# Patient Record
Sex: MALE | Race: BLACK OR AFRICAN AMERICAN | Employment: OTHER | URBAN - METROPOLITAN AREA
[De-identification: names, ages, dates, MRNs, and addresses within clinical notes are randomized per-mention and may not be internally consistent; named-entity substitution may affect disease eponyms.]

---

## 2023-02-01 PROBLEM — Z96.1: Noted: 2023-02-01

## 2023-04-25 ENCOUNTER — FOLLOW UP (OUTPATIENT)
Dept: URBAN - METROPOLITAN AREA CLINIC 26 | Facility: CLINIC | Age: 78
End: 2023-04-25

## 2023-04-25 DIAGNOSIS — E11.3293: ICD-10-CM

## 2023-04-25 DIAGNOSIS — H35.62: ICD-10-CM

## 2023-04-25 DIAGNOSIS — H30.23: ICD-10-CM

## 2023-04-25 DIAGNOSIS — H20.9: ICD-10-CM

## 2023-04-25 DIAGNOSIS — H35.373: ICD-10-CM

## 2023-04-25 DIAGNOSIS — H43.813: ICD-10-CM

## 2023-04-25 DIAGNOSIS — H04.123: ICD-10-CM

## 2023-04-25 PROCEDURE — 92014 COMPRE OPH EXAM EST PT 1/>: CPT

## 2023-04-25 PROCEDURE — 92250 FUNDUS PHOTOGRAPHY W/I&R: CPT

## 2023-04-25 RX ORDER — PREDNISOLONE ACETATE 10 MG/ML: 1 SUSPENSION/ DROPS OPHTHALMIC

## 2023-04-25 ASSESSMENT — VISUAL ACUITY
OS_SC: 20/20
OD_SC: 20/20-2

## 2023-04-25 ASSESSMENT — TONOMETRY
OS_IOP_MMHG: 13
OD_IOP_MMHG: 15

## 2023-05-15 ENCOUNTER — FOLLOW UP (OUTPATIENT)
Dept: URBAN - METROPOLITAN AREA CLINIC 26 | Facility: CLINIC | Age: 78
End: 2023-05-15

## 2023-05-15 DIAGNOSIS — H30.23: ICD-10-CM

## 2023-05-15 DIAGNOSIS — H43.813: ICD-10-CM

## 2023-05-15 DIAGNOSIS — H35.373: ICD-10-CM

## 2023-05-15 DIAGNOSIS — H04.123: ICD-10-CM

## 2023-05-15 DIAGNOSIS — H20.9: ICD-10-CM

## 2023-05-15 DIAGNOSIS — E11.3293: ICD-10-CM

## 2023-05-15 DIAGNOSIS — H35.63: ICD-10-CM

## 2023-05-15 PROCEDURE — 92134 CPTRZ OPH DX IMG PST SGM RTA: CPT

## 2023-05-15 PROCEDURE — 92250 FUNDUS PHOTOGRAPHY W/I&R: CPT

## 2023-05-15 PROCEDURE — 92012 INTRM OPH EXAM EST PATIENT: CPT

## 2023-05-15 ASSESSMENT — TONOMETRY
OD_IOP_MMHG: 18
OS_IOP_MMHG: 16

## 2023-05-15 ASSESSMENT — VISUAL ACUITY
OD_SC: 20/20-1
OS_SC: 20/30+2

## 2023-06-01 ENCOUNTER — FOLLOW UP (OUTPATIENT)
Dept: URBAN - METROPOLITAN AREA CLINIC 26 | Facility: CLINIC | Age: 78
End: 2023-06-01

## 2023-06-01 DIAGNOSIS — E11.3293: ICD-10-CM

## 2023-06-01 DIAGNOSIS — H35.373: ICD-10-CM

## 2023-06-01 DIAGNOSIS — H30.23: ICD-10-CM

## 2023-06-01 DIAGNOSIS — H35.63: ICD-10-CM

## 2023-06-01 DIAGNOSIS — H04.123: ICD-10-CM

## 2023-06-01 DIAGNOSIS — H20.9: ICD-10-CM

## 2023-06-01 DIAGNOSIS — H43.813: ICD-10-CM

## 2023-06-01 PROCEDURE — 92012 INTRM OPH EXAM EST PATIENT: CPT

## 2023-06-01 RX ORDER — DOXYCYCLINE HYCLATE 100MG 100 MG/1: 1 CAPSULE ORAL TWICE A DAY

## 2023-06-01 ASSESSMENT — VISUAL ACUITY
OD_SC: 20/25-1
OS_SC: 20/20-2

## 2023-06-01 ASSESSMENT — TONOMETRY
OS_IOP_MMHG: 12
OD_IOP_MMHG: 15

## 2023-06-12 ENCOUNTER — EMERGENCY VISIT (OUTPATIENT)
Dept: URBAN - METROPOLITAN AREA CLINIC 26 | Facility: CLINIC | Age: 78
End: 2023-06-12

## 2023-06-12 DIAGNOSIS — H04.123: ICD-10-CM

## 2023-06-12 DIAGNOSIS — H20.9: ICD-10-CM

## 2023-06-12 DIAGNOSIS — E11.3293: ICD-10-CM

## 2023-06-12 DIAGNOSIS — H30.23: ICD-10-CM

## 2023-06-12 DIAGNOSIS — H35.373: ICD-10-CM

## 2023-06-12 DIAGNOSIS — H35.63: ICD-10-CM

## 2023-06-12 DIAGNOSIS — H43.813: ICD-10-CM

## 2023-06-12 PROCEDURE — 92012 INTRM OPH EXAM EST PATIENT: CPT

## 2023-06-12 RX ORDER — PREDNISOLONE ACETATE 10 MG/ML: 1 SUSPENSION/ DROPS OPHTHALMIC

## 2023-06-12 ASSESSMENT — VISUAL ACUITY: OD_SC: 20/30+2

## 2023-06-12 ASSESSMENT — TONOMETRY: OD_IOP_MMHG: 12

## 2023-07-17 ENCOUNTER — FOLLOW UP (OUTPATIENT)
Dept: URBAN - METROPOLITAN AREA CLINIC 26 | Facility: CLINIC | Age: 78
End: 2023-07-17

## 2023-07-17 DIAGNOSIS — H43.813: ICD-10-CM

## 2023-07-17 DIAGNOSIS — H04.123: ICD-10-CM

## 2023-07-17 DIAGNOSIS — H35.63: ICD-10-CM

## 2023-07-17 DIAGNOSIS — H35.373: ICD-10-CM

## 2023-07-17 DIAGNOSIS — E11.3293: ICD-10-CM

## 2023-07-17 DIAGNOSIS — H30.23: ICD-10-CM

## 2023-07-17 DIAGNOSIS — D48.5: ICD-10-CM

## 2023-07-17 PROCEDURE — 92014 COMPRE OPH EXAM EST PT 1/>: CPT

## 2023-07-17 PROCEDURE — 92134 CPTRZ OPH DX IMG PST SGM RTA: CPT

## 2023-07-17 PROCEDURE — 92250 FUNDUS PHOTOGRAPHY W/I&R: CPT

## 2023-07-17 ASSESSMENT — TONOMETRY
OS_IOP_MMHG: 14
OD_IOP_MMHG: 17

## 2023-07-17 ASSESSMENT — VISUAL ACUITY
OD_SC: 20/20-2
OS_SC: 20/20

## 2023-08-21 ENCOUNTER — FOLLOW UP (OUTPATIENT)
Dept: URBAN - METROPOLITAN AREA CLINIC 26 | Facility: CLINIC | Age: 78
End: 2023-08-21

## 2023-08-21 DIAGNOSIS — H35.373: ICD-10-CM

## 2023-08-21 DIAGNOSIS — H30.23: ICD-10-CM

## 2023-08-21 DIAGNOSIS — H04.123: ICD-10-CM

## 2023-08-21 DIAGNOSIS — E11.3293: ICD-10-CM

## 2023-08-21 DIAGNOSIS — H20.9: ICD-10-CM

## 2023-08-21 DIAGNOSIS — D48.5: ICD-10-CM

## 2023-08-21 DIAGNOSIS — H35.63: ICD-10-CM

## 2023-08-21 DIAGNOSIS — H43.813: ICD-10-CM

## 2023-08-21 PROCEDURE — 92134 CPTRZ OPH DX IMG PST SGM RTA: CPT

## 2023-08-21 PROCEDURE — 99213 OFFICE O/P EST LOW 20 MIN: CPT

## 2023-08-21 PROCEDURE — 92250 FUNDUS PHOTOGRAPHY W/I&R: CPT

## 2023-08-21 ASSESSMENT — VISUAL ACUITY
OD_SC: 20/20-2
OS_SC: 20/25+2

## 2023-08-21 ASSESSMENT — TONOMETRY
OD_IOP_MMHG: 19
OS_IOP_MMHG: 13

## 2023-10-24 ENCOUNTER — FOLLOW UP (OUTPATIENT)
Dept: URBAN - METROPOLITAN AREA CLINIC 26 | Facility: CLINIC | Age: 78
End: 2023-10-24

## 2023-10-24 DIAGNOSIS — H04.123: ICD-10-CM

## 2023-10-24 DIAGNOSIS — E11.3293: ICD-10-CM

## 2023-10-24 DIAGNOSIS — H30.23: ICD-10-CM

## 2023-10-24 DIAGNOSIS — H20.9: ICD-10-CM

## 2023-10-24 DIAGNOSIS — H43.813: ICD-10-CM

## 2023-10-24 DIAGNOSIS — H35.63: ICD-10-CM

## 2023-10-24 DIAGNOSIS — Z96.1: ICD-10-CM

## 2023-10-24 DIAGNOSIS — H35.373: ICD-10-CM

## 2023-10-24 PROCEDURE — 92235 FLUORESCEIN ANGRPH MLTIFRAME: CPT

## 2023-10-24 PROCEDURE — 99213 OFFICE O/P EST LOW 20 MIN: CPT

## 2023-10-24 PROCEDURE — 92134 CPTRZ OPH DX IMG PST SGM RTA: CPT

## 2023-10-24 PROCEDURE — 92250 FUNDUS PHOTOGRAPHY W/I&R: CPT

## 2023-10-24 ASSESSMENT — VISUAL ACUITY
OD_SC: 20/15
OS_SC: 20/25-2

## 2023-10-24 ASSESSMENT — TONOMETRY
OD_IOP_MMHG: 14
OS_IOP_MMHG: 15

## 2025-01-28 ENCOUNTER — FOLLOW UP (OUTPATIENT)
Age: 80
End: 2025-01-28

## 2025-01-28 VITALS — BODY MASS INDEX: 27.35 KG/M2 | WEIGHT: 191 LBS | HEIGHT: 70 IN

## 2025-01-28 DIAGNOSIS — H30.23: ICD-10-CM

## 2025-01-28 DIAGNOSIS — H04.123: ICD-10-CM

## 2025-01-28 DIAGNOSIS — H43.813: ICD-10-CM

## 2025-01-28 DIAGNOSIS — E11.3293: ICD-10-CM

## 2025-01-28 DIAGNOSIS — H20.9: ICD-10-CM

## 2025-01-28 DIAGNOSIS — H35.63: ICD-10-CM

## 2025-01-28 DIAGNOSIS — D48.5: ICD-10-CM

## 2025-01-28 DIAGNOSIS — H35.373: ICD-10-CM

## 2025-01-28 PROCEDURE — 92014 COMPRE OPH EXAM EST PT 1/>: CPT

## 2025-01-28 PROCEDURE — 92134 CPTRZ OPH DX IMG PST SGM RTA: CPT

## 2025-01-28 PROCEDURE — 92250 FUNDUS PHOTOGRAPHY W/I&R: CPT | Mod: 59

## 2025-03-26 ENCOUNTER — P2P PATIENT RECORD (OUTPATIENT)
Age: 80
End: 2025-03-26

## 2025-03-27 ENCOUNTER — TELEPHONE ENCOUNTER (OUTPATIENT)
Dept: URBAN - METROPOLITAN AREA CLINIC 64 | Facility: CLINIC | Age: 80
End: 2025-03-27

## 2025-04-03 ENCOUNTER — LAB OUTSIDE AN ENCOUNTER (OUTPATIENT)
Dept: URBAN - METROPOLITAN AREA CLINIC 63 | Facility: CLINIC | Age: 80
End: 2025-04-03

## 2025-04-03 ENCOUNTER — DASHBOARD ENCOUNTERS (OUTPATIENT)
Age: 80
End: 2025-04-03

## 2025-04-03 ENCOUNTER — OFFICE VISIT (OUTPATIENT)
Dept: URBAN - METROPOLITAN AREA CLINIC 63 | Facility: CLINIC | Age: 80
End: 2025-04-03
Payer: COMMERCIAL

## 2025-04-03 DIAGNOSIS — R13.19 ESOPHAGEAL DYSPHAGIA: ICD-10-CM

## 2025-04-03 PROCEDURE — 99204 OFFICE O/P NEW MOD 45 MIN: CPT

## 2025-04-03 RX ORDER — LOSARTAN POTASSIUM 50 MG/1
TABLET, FILM COATED ORAL
Qty: 100 TABLET | Status: ACTIVE | COMMUNITY

## 2025-04-03 RX ORDER — ROSUVASTATIN CALCIUM 10 MG/1
TABLET, FILM COATED ORAL
Qty: 100 TABLET | Status: ACTIVE | COMMUNITY

## 2025-04-03 RX ORDER — SEMAGLUTIDE 0.68 MG/ML
INJECTION, SOLUTION SUBCUTANEOUS
Qty: 9 MILLILITER | Status: ACTIVE | COMMUNITY

## 2025-04-03 RX ORDER — LABETALOL HYDROCHLORIDE 300 MG/1
TABLET, FILM COATED ORAL
Qty: 180 TABLET | Status: ACTIVE | COMMUNITY

## 2025-04-03 RX ORDER — ASPIRIN 81 MG/1
1 TABLET TABLET, COATED ORAL ONCE A DAY
Qty: 30 | Status: ACTIVE | COMMUNITY
Start: 2025-04-03

## 2025-04-03 RX ORDER — LEVOTHYROXINE SODIUM 50 UG/1
TABLET ORAL
Qty: 90 TABLET | Status: ACTIVE | COMMUNITY

## 2025-04-03 RX ORDER — PANTOPRAZOLE SODIUM 40 MG/1
TABLET, DELAYED RELEASE ORAL
Qty: 90 TABLET | Status: ACTIVE | COMMUNITY

## 2025-04-03 RX ORDER — METFORMIN HYDROCHLORIDE 500 MG/1
TABLET ORAL
Qty: 200 TABLET | Status: ACTIVE | COMMUNITY

## 2025-04-03 NOTE — HPI-PREVIOUS LABS
12/7/2024 CK high, CMP within normal limits, cholesterol 200 and , non-, TSH within normal limits, CBC not performed.

## 2025-04-03 NOTE — HPI-ZZZTODAY'S VISIT
Patient is a very pleasant 79-year-old male who presents as a new patient to our practice to discuss upper endoscopy and trouble swallowing.  He will be establishing with Dr. Hale today.  Past medical history significant for hypothyroidism, hyperlipidemia, posterior uveitis, hypertension, history of malignant neoplasm of the prostate, diabetes, CAD with not stent- 81 mg asa for preventative measures. Past surgical history reviewed.  Last colonoscopy about 20 years ago. Apparently in [place]. Last endoscopy at least 20 years ago. Family history noncontributory.  Patient presents for upper GI complaints. He relates a few months ago his right upper rib was bothering him until 3-4 days ago. It was so bad that they did an xray that apparently he had a history of a remote fracture. He admits that sometimes when he is eating he will chew many times before swallowing and when the bolus gets to his epigastric region and stay for some time before going down. He thinks it might depend on what he is eating. He has this happen seldomly. Last episode was about 3 weeks ago. He used to get pyrosis about 1996, but then he stopped drinking soda and this resolved these symptoms. He is on pantoprazole 40 and this helps his acid reflux. He had stopped previously, but he had been on it for three months, but he was concerned about long term side effects. His PCP put him back on this. He does admit that about a year ago he had a negative Cologuard.

## 2025-04-14 ENCOUNTER — TELEPHONE ENCOUNTER (OUTPATIENT)
Dept: URBAN - METROPOLITAN AREA CLINIC 63 | Facility: CLINIC | Age: 80
End: 2025-04-14

## 2025-06-02 ENCOUNTER — TELEPHONE ENCOUNTER (OUTPATIENT)
Dept: URBAN - METROPOLITAN AREA CLINIC 63 | Facility: CLINIC | Age: 80
End: 2025-06-02

## 2025-06-04 ENCOUNTER — OFFICE VISIT (OUTPATIENT)
Dept: URBAN - METROPOLITAN AREA SURGERY CENTER 4 | Facility: SURGERY CENTER | Age: 80
End: 2025-06-04

## 2025-06-09 ENCOUNTER — CLAIMS CREATED FROM THE CLAIM WINDOW (OUTPATIENT)
Dept: URBAN - METROPOLITAN AREA SURGERY CENTER 4 | Facility: SURGERY CENTER | Age: 80
End: 2025-06-09
Payer: COMMERCIAL

## 2025-06-09 ENCOUNTER — CLAIMS CREATED FROM THE CLAIM WINDOW (OUTPATIENT)
Dept: URBAN - METROPOLITAN AREA CLINIC 4 | Facility: CLINIC | Age: 80
End: 2025-06-09
Payer: COMMERCIAL

## 2025-06-09 DIAGNOSIS — K29.00 ACUTE GASTRITIS WITHOUT HEMORRHAGE, UNSPECIFIED GASTRITIS TYPE: ICD-10-CM

## 2025-06-09 DIAGNOSIS — K31.7 POLYP OF STOMACH AND DUODENUM: ICD-10-CM

## 2025-06-09 DIAGNOSIS — K44.9 DIAPHRAGMATIC HERNIA WITHOUT OBSTRUCTION OR GANGRENE: ICD-10-CM

## 2025-06-09 DIAGNOSIS — K31.89 OTHER DISEASES OF STOMACH AND DUODENUM: ICD-10-CM

## 2025-06-09 DIAGNOSIS — K22.81 ESOPHAGEAL POLYP: ICD-10-CM

## 2025-06-09 DIAGNOSIS — K29.70 GASTRITIS, UNSPECIFIED, WITHOUT BLEEDING: ICD-10-CM

## 2025-06-09 DIAGNOSIS — K21.9 GASTRO-ESOPHAGEAL REFLUX DISEASE WITHOUT ESOPHAGITIS: ICD-10-CM

## 2025-06-09 DIAGNOSIS — K22.2 SCHATZKI'S RING: ICD-10-CM

## 2025-06-09 DIAGNOSIS — K22.89 OTHER SPECIFIED DISEASE OF ESOPHAGUS: ICD-10-CM

## 2025-06-09 PROCEDURE — 43239 EGD BIOPSY SINGLE/MULTIPLE: CPT | Performed by: INTERNAL MEDICINE

## 2025-06-09 PROCEDURE — 43450 DILATE ESOPHAGUS 1/MULT PASS: CPT | Performed by: INTERNAL MEDICINE

## 2025-06-09 PROCEDURE — 00731 ANES UPR GI NDSC PX NOS: CPT | Performed by: NURSE ANESTHETIST, CERTIFIED REGISTERED

## 2025-06-09 PROCEDURE — 88305 TISSUE EXAM BY PATHOLOGIST: CPT | Performed by: PATHOLOGY

## 2025-06-09 PROCEDURE — 88312 SPECIAL STAINS GROUP 1: CPT | Performed by: PATHOLOGY

## 2025-06-09 RX ORDER — ROSUVASTATIN CALCIUM 10 MG/1
TABLET, FILM COATED ORAL
Qty: 100 TABLET | Status: ACTIVE | COMMUNITY

## 2025-06-09 RX ORDER — PANTOPRAZOLE SODIUM 40 MG/1
TABLET, DELAYED RELEASE ORAL
Qty: 90 TABLET | Status: ACTIVE | COMMUNITY

## 2025-06-09 RX ORDER — METFORMIN HYDROCHLORIDE 500 MG/1
TABLET ORAL
Qty: 200 TABLET | Status: ACTIVE | COMMUNITY

## 2025-06-09 RX ORDER — SEMAGLUTIDE 0.68 MG/ML
INJECTION, SOLUTION SUBCUTANEOUS
Qty: 9 MILLILITER | Status: ACTIVE | COMMUNITY

## 2025-06-09 RX ORDER — LOSARTAN POTASSIUM 50 MG/1
TABLET, FILM COATED ORAL
Qty: 100 TABLET | Status: ACTIVE | COMMUNITY

## 2025-06-09 RX ORDER — LEVOTHYROXINE SODIUM 50 UG/1
TABLET ORAL
Qty: 90 TABLET | Status: ACTIVE | COMMUNITY

## 2025-06-09 RX ORDER — ASPIRIN 81 MG/1
1 TABLET TABLET, COATED ORAL ONCE A DAY
Qty: 30 | Status: ACTIVE | COMMUNITY
Start: 2025-04-03

## 2025-06-09 RX ORDER — LABETALOL HYDROCHLORIDE 300 MG/1
TABLET, FILM COATED ORAL
Qty: 180 TABLET | Status: ACTIVE | COMMUNITY

## 2025-07-15 ENCOUNTER — OFFICE VISIT (OUTPATIENT)
Dept: URBAN - METROPOLITAN AREA SURGERY CENTER 4 | Facility: SURGERY CENTER | Age: 80
End: 2025-07-15

## 2025-07-29 ENCOUNTER — OFFICE VISIT (OUTPATIENT)
Dept: URBAN - METROPOLITAN AREA CLINIC 63 | Facility: CLINIC | Age: 80
End: 2025-07-29

## 2025-07-29 RX ORDER — SEMAGLUTIDE 0.68 MG/ML
INJECTION, SOLUTION SUBCUTANEOUS
Qty: 9 MILLILITER | Status: ACTIVE | COMMUNITY

## 2025-07-29 RX ORDER — LEVOTHYROXINE SODIUM 50 UG/1
TABLET ORAL
Qty: 90 TABLET | Status: ACTIVE | COMMUNITY

## 2025-07-29 RX ORDER — LOSARTAN POTASSIUM 50 MG/1
TABLET, FILM COATED ORAL
Qty: 100 TABLET | Status: ACTIVE | COMMUNITY

## 2025-07-29 RX ORDER — PANTOPRAZOLE SODIUM 40 MG/1
TABLET, DELAYED RELEASE ORAL
Qty: 90 TABLET | Status: ACTIVE | COMMUNITY

## 2025-07-29 RX ORDER — LABETALOL HYDROCHLORIDE 300 MG/1
TABLET, FILM COATED ORAL
Qty: 180 TABLET | Status: ACTIVE | COMMUNITY

## 2025-07-29 RX ORDER — ASPIRIN 81 MG/1
1 TABLET TABLET, COATED ORAL ONCE A DAY
Qty: 30 | Status: ACTIVE | COMMUNITY
Start: 2025-04-03

## 2025-07-29 RX ORDER — METFORMIN HYDROCHLORIDE 500 MG/1
TABLET ORAL
Qty: 200 TABLET | Status: ACTIVE | COMMUNITY

## 2025-07-29 RX ORDER — ROSUVASTATIN CALCIUM 10 MG/1
TABLET, FILM COATED ORAL
Qty: 100 TABLET | Status: ACTIVE | COMMUNITY

## 2025-07-29 NOTE — HPI-PREVIOUS IMAGING
5/27/2025 barium swallow consistent with irregular mild distal esophageal stricture concerning for possible distal esophageal malignancy.  Differential including benign stricture EGD recommended for further evaluation.  Small hiatal hernia and mild esophageal dysmotility

## 2025-07-29 NOTE — HPI-ZZZTODAY'S VISIT
Patient is a very pleasant 79-year-old male who presents for EGD follow-up.  He is a patient of Dr. Hale.  I last saw him 4/3/2025.  Past medical history significant for hypothyroidism, hyperlipidemia, posterior uveitis, hypertension, history of prostate cancer, diabetes, CAD with no history of stent.  81 ASA for preventative measures.  Past surgical history reviewed.  Last colonoscopy about 20 years ago.  Apparently in [place].  Last endoscopy with dilation 6/9/2025.  Previously

## 2025-07-29 NOTE — HPI-PREVIOUS PROCEDURES
6/9/2025 endoscopy with Dr. Hale consistent with esophageal mucosal changes suspicious for EOE Esophageal polyp Mild and low-grade narrowing a Schatzki's ring dilated with Almanza dilator which could not be passed at 54 [place] 2 cm hiatal hernia Acute gastritis with erosions and congestion Normal duodenal bulb 2 duodenal polyps white fluid discharge after biopsy consistent with glycogenic acanthosis Distal esophageal biopsy with reflux type changes negative for Jacobsen's, gastric antrum with chemical reactive gastropathy negative for H. pylori

## 2025-08-01 ENCOUNTER — FOLLOW UP (OUTPATIENT)
Age: 80
End: 2025-08-01

## 2025-08-01 DIAGNOSIS — H20.9: ICD-10-CM

## 2025-08-01 DIAGNOSIS — D48.5: ICD-10-CM

## 2025-08-01 DIAGNOSIS — Z96.1: ICD-10-CM

## 2025-08-01 DIAGNOSIS — H04.123: ICD-10-CM

## 2025-08-01 DIAGNOSIS — H35.373: ICD-10-CM

## 2025-08-01 DIAGNOSIS — E11.3293: ICD-10-CM

## 2025-08-01 DIAGNOSIS — H35.63: ICD-10-CM

## 2025-08-01 DIAGNOSIS — H30.23: ICD-10-CM

## 2025-08-01 DIAGNOSIS — H43.813: ICD-10-CM

## 2025-08-01 PROCEDURE — 92250 FUNDUS PHOTOGRAPHY W/I&R: CPT

## 2025-08-01 PROCEDURE — 92134 CPTRZ OPH DX IMG PST SGM RTA: CPT

## 2025-08-01 PROCEDURE — 92014 COMPRE OPH EXAM EST PT 1/>: CPT
